# Patient Record
Sex: MALE | Race: WHITE | Employment: OTHER | URBAN - NONMETROPOLITAN AREA
[De-identification: names, ages, dates, MRNs, and addresses within clinical notes are randomized per-mention and may not be internally consistent; named-entity substitution may affect disease eponyms.]

---

## 2020-11-05 ENCOUNTER — TELEPHONE (OUTPATIENT)
Dept: FAMILY MEDICINE | Facility: OTHER | Age: 65
End: 2020-11-05

## 2020-11-05 DIAGNOSIS — E87.6 HYPOKALEMIA: ICD-10-CM

## 2020-11-05 DIAGNOSIS — F31.9 BIPOLAR AFFECTIVE DISORDER, REMISSION STATUS UNSPECIFIED (H): ICD-10-CM

## 2020-11-05 DIAGNOSIS — E87.1 HYPO-OSMOLALITY AND HYPONATREMIA: ICD-10-CM

## 2020-11-05 DIAGNOSIS — I10 ESSENTIAL HYPERTENSION: Primary | ICD-10-CM

## 2020-11-05 DIAGNOSIS — E66.9 OBESITY: ICD-10-CM

## 2020-11-05 DIAGNOSIS — L03.116 CELLULITIS OF LEFT LOWER LIMB: ICD-10-CM

## 2020-11-05 DIAGNOSIS — K21.9 GASTROESOPHAGEAL REFLUX DISEASE WITHOUT ESOPHAGITIS: ICD-10-CM

## 2020-11-05 DIAGNOSIS — M62.262 NONTRAUMATIC ISCHEMIC INFARCTION OF MUSCLE OF LEFT LOWER LEG: ICD-10-CM

## 2020-11-05 DIAGNOSIS — I48.0 PAROXYSMAL ATRIAL FIBRILLATION (H): ICD-10-CM

## 2020-11-05 DIAGNOSIS — F20.9 SCHIZOPHRENIA, UNSPECIFIED TYPE (H): ICD-10-CM

## 2020-11-05 RX ORDER — MULTIVITAMIN
1 TABLET ORAL DAILY
COMMUNITY
Start: 2020-11-05

## 2020-11-05 RX ORDER — CETIRIZINE HYDROCHLORIDE 10 MG/1
10 TABLET ORAL DAILY
COMMUNITY
Start: 2020-11-05

## 2020-11-05 RX ORDER — METOPROLOL SUCCINATE 50 MG/1
50 TABLET, EXTENDED RELEASE ORAL 2 TIMES DAILY
COMMUNITY
Start: 2020-11-05

## 2020-11-05 RX ORDER — ATORVASTATIN CALCIUM 80 MG/1
80 TABLET, FILM COATED ORAL
COMMUNITY
Start: 2020-11-05

## 2020-11-05 RX ORDER — AMITRIPTYLINE HYDROCHLORIDE 75 MG/1
75 TABLET ORAL AT BEDTIME
COMMUNITY
Start: 2020-11-05

## 2020-11-05 RX ORDER — ACETAMINOPHEN 325 MG/1
650 TABLET ORAL EVERY 6 HOURS PRN
COMMUNITY
Start: 2020-11-05

## 2020-11-05 RX ORDER — CLOPIDOGREL BISULFATE 75 MG/1
75 TABLET ORAL DAILY
COMMUNITY
Start: 2020-11-05

## 2020-11-05 RX ORDER — GABAPENTIN 100 MG/1
200 CAPSULE ORAL 2 TIMES DAILY
COMMUNITY
Start: 2020-11-05

## 2020-11-05 RX ORDER — OMEPRAZOLE 20 MG/1
20 TABLET, DELAYED RELEASE ORAL DAILY
COMMUNITY
Start: 2020-11-05

## 2020-11-05 RX ORDER — HALOPERIDOL 10 MG/1
10 TABLET ORAL 2 TIMES DAILY
COMMUNITY
Start: 2020-11-05

## 2020-11-05 RX ORDER — FERROUS GLUCONATE 324(38)MG
324 TABLET ORAL
COMMUNITY
Start: 2020-11-05

## 2020-11-05 RX ORDER — CHOLECALCIFEROL (VITAMIN D3) 25 MCG
1 CAPSULE ORAL DAILY
COMMUNITY
Start: 2020-11-05

## 2020-11-05 RX ORDER — DIGOXIN 125 MCG
125 TABLET ORAL DAILY
COMMUNITY
Start: 2020-11-05

## 2020-11-05 NOTE — TELEPHONE ENCOUNTER
----- Message from Bruna Timmons MD sent at 11/3/2020  1:35 PM CST -----  Regarding: FW: New [facility] patient  Please call for medication list and problem list.  Thanks   ----- Message -----  From: Maite Hernandez  Sent: 11/3/2020   1:20 PM CST  To: Bruna Timmons MD  Subject: New [facility] patient                             New patient assigned to you.  Staying at the [facility] 53 MRN [id number]

## 2020-11-05 NOTE — TELEPHONE ENCOUNTER
Medication and problem list updated. Fax from [facility] in MD's in-basket.     Fabiola Crow LPN.................. 11/5/2020 9:57 AM

## 2020-11-11 ENCOUNTER — NURSING HOME VISIT (OUTPATIENT)
Dept: GERIATRICS | Facility: OTHER | Age: 65
End: 2020-11-11
Attending: NURSE PRACTITIONER
Payer: MEDICARE

## 2020-11-11 DIAGNOSIS — L03.116 CELLULITIS OF LEFT LOWER LIMB: Primary | ICD-10-CM

## 2020-11-11 PROCEDURE — 99315 NF DSCHRG MGMT 30 MIN/LESS: CPT | Performed by: NURSE PRACTITIONER

## 2020-11-13 NOTE — PROGRESS NOTES
Visit Date:   11/11/2020      SKILLED NURSING FACILITY DISCHARGE SUMMARY      SKILLED NURSING FACILITY DATE OF ADMISSION:  11/03/2020.  Admitted from [facility].      MEDICAL HISTORY:  Cellulitis of left lower extremity, bipolar 1, atrial flutter with RVR, PAD, hyponatremia and hypokalemia, which resolved during hospitalization, chronic tobacco use.      MEDICATIONS:   1.  Amitriptyline 75 mg daily.   2.  Lipitor 80 mg daily.   3.  Zyrtec 10 mg daily.   4.  Plavix 75 mg daily.   5.  Vitamin B12 at 1000 mcg daily.   6.  Digoxin 0.125 mg daily.   7.  Iron sulfate 324 mg 1 tablet daily.   8.  Multivitamin 1 tablet daily.   9.  Omeprazole 20 mg 1 tablet daily.   10.  Vitamin D3 at 25 mcg 1 tablet daily.   11.  Vitamin E 100 units 1 tablet daily.   12.  Eliquis 5 mg 1 tablet twice daily.   13.  Gabapentin 100 mg 2 tablets twice daily.   14.  Haldol 10 mg twice daily.   15.  Metoprolol tartrate 50 mg 1 tablet twice daily.   16.  Acetaminophen 325 mg 2 tablets every 6 hours as needed.      ADVANCE DIRECTIVE:  CPR.      SUMMARY OF SKILLED NURSING FACILITY CARE:  The patient had a hospital stay at [facility] for cellulitis of the left lower extremity, which occurred after a previous hospitalization for a thrombectomy.  He was treated with IV and oral antibiotics.  He also had atrial flutter with RVR.  He did not tolerate amiodarone or sotalol.  He was treated with digoxin and metoprolol.  He is planning to have followup with Cardiology and also with wound care out of the [facility].      The patient was fairly independent upon admission.  He needed some help with skilled nursing for the wound at the left lower leg.  He also received some PT and OT to help with some strengthening.  He otherwise is independent in all cares.  He has been pleased with his stay at skilled nursing facility and is planning to discharge home.      DIAGNOSTIC TESTS:  None available per hospital discharge summary.      REVIEW OF  SYSTEMS:  A 12-point complete review of systems discussed with nursing staff and resident, all negative with the exception of positive findings of a wound at the left lower leg that is healing by secondary intention after thrombectomy at a previous hospitalization.      VITAL SIGNS:  Blood pressure 144/86, pulse 57, respiratory rate 16, temperature 96, SpO2 98% on room air, weight 232 pounds.      PHYSICAL EXAMINATION:   GENERAL:  Pleasant gentleman lying in bed, no acute distress.  Affect normal.  Alert and pleasant.  Answers questions appropriately.   SKIN:  Color pink.   HEENT:  Sclerae nonicteric.  Mucous membranes moist.   NECK:  Supple without adenopathy.  No thyromegaly.   LUNGS:  Fields clear to auscultation throughout.   CARDIOVASCULAR:  Regular rate and rhythm with no S3 auscultated.   ABDOMEN:  Soft and without masses, tenderness and organomegaly.   EXTREMITIES:  Bilateral extremities are without edema.  He has a dressing on the wound over the left shin, but there is no surrounding erythema noted.  There has been no purulent drainage per nursing staff.  They report this is healing nicely.      PLANNED DATE OF DISCHARGE:  2020      DISCHARGE PLAN:  Discharged to home with mental health services, and also will [facility] for [facility].        FOLLOWUP APPOINTMENTS:  Patient should follow-[facility] with [facility] within [facility] week of [facility] nursing facility discharge.  He will discuss with primary physician to determine if Cardiology referral is needed.      TIME SPENT ON DISCHARGE:  28 minutes.         DEVIN MISHRA NP             D: 2020   T: 2020   MT: OLLIE      Name:     JORDAN RIVERA   MRN:      [id number]        Account:      [id number]   :      1955           Visit Date:   2020      Document: [id number]       cc: [facility]

## 2020-11-29 ENCOUNTER — HOSPITAL ENCOUNTER (EMERGENCY)
Facility: OTHER | Age: 65
Discharge: NURSING, REHAB OR LONG-TERM CARE | End: 2020-11-29
Attending: PHYSICIAN ASSISTANT | Admitting: PHYSICIAN ASSISTANT
Payer: MEDICARE

## 2020-11-29 ENCOUNTER — APPOINTMENT (OUTPATIENT)
Dept: GENERAL RADIOLOGY | Facility: OTHER | Age: 65
End: 2020-11-29
Attending: PHYSICIAN ASSISTANT
Payer: MEDICARE

## 2020-11-29 VITALS
BODY MASS INDEX: 31.01 KG/M2 | WEIGHT: 234 LBS | SYSTOLIC BLOOD PRESSURE: 164 MMHG | OXYGEN SATURATION: 96 % | DIASTOLIC BLOOD PRESSURE: 101 MMHG | HEIGHT: 73 IN | HEART RATE: 84 BPM | RESPIRATION RATE: 15 BRPM

## 2020-11-29 DIAGNOSIS — R00.0 TACHYCARDIA: ICD-10-CM

## 2020-11-29 LAB
ANION GAP SERPL CALCULATED.3IONS-SCNC: 9 MMOL/L (ref 3–14)
BASOPHILS # BLD AUTO: 0 10E9/L (ref 0–0.2)
BASOPHILS NFR BLD AUTO: 0.8 %
BUN SERPL-MCNC: 13 MG/DL (ref 7–25)
CALCIUM SERPL-MCNC: 9.5 MG/DL (ref 8.6–10.3)
CHLORIDE SERPL-SCNC: 104 MMOL/L (ref 98–107)
CO2 SERPL-SCNC: 24 MMOL/L (ref 21–31)
CREAT SERPL-MCNC: 1.05 MG/DL (ref 0.7–1.3)
DIFFERENTIAL METHOD BLD: NORMAL
EOSINOPHIL # BLD AUTO: 0.1 10E9/L (ref 0–0.7)
EOSINOPHIL NFR BLD AUTO: 1.8 %
ERYTHROCYTE [DISTWIDTH] IN BLOOD BY AUTOMATED COUNT: 13.9 % (ref 10–15)
GFR SERPL CREATININE-BSD FRML MDRD: 71 ML/MIN/{1.73_M2}
GLUCOSE SERPL-MCNC: 100 MG/DL (ref 70–105)
HCT VFR BLD AUTO: 42.9 % (ref 40–53)
HGB BLD-MCNC: 14.1 G/DL (ref 13.3–17.7)
IMM GRANULOCYTES # BLD: 0 10E9/L (ref 0–0.4)
IMM GRANULOCYTES NFR BLD: 0.4 %
LYMPHOCYTES # BLD AUTO: 1.5 10E9/L (ref 0.8–5.3)
LYMPHOCYTES NFR BLD AUTO: 29.5 %
MCH RBC QN AUTO: 29.9 PG (ref 26.5–33)
MCHC RBC AUTO-ENTMCNC: 32.9 G/DL (ref 31.5–36.5)
MCV RBC AUTO: 91 FL (ref 78–100)
MONOCYTES # BLD AUTO: 0.5 10E9/L (ref 0–1.3)
MONOCYTES NFR BLD AUTO: 10.8 %
NEUTROPHILS # BLD AUTO: 2.9 10E9/L (ref 1.6–8.3)
NEUTROPHILS NFR BLD AUTO: 56.7 %
PLATELET # BLD AUTO: 155 10E9/L (ref 150–450)
POTASSIUM SERPL-SCNC: 3.8 MMOL/L (ref 3.5–5.1)
RBC # BLD AUTO: 4.71 10E12/L (ref 4.4–5.9)
SODIUM SERPL-SCNC: 137 MMOL/L (ref 134–144)
WBC # BLD AUTO: 5 10E9/L (ref 4–11)

## 2020-11-29 PROCEDURE — 71045 X-RAY EXAM CHEST 1 VIEW: CPT | Mod: TC

## 2020-11-29 PROCEDURE — 80048 BASIC METABOLIC PNL TOTAL CA: CPT | Performed by: PHYSICIAN ASSISTANT

## 2020-11-29 PROCEDURE — 99283 EMERGENCY DEPT VISIT LOW MDM: CPT | Performed by: PHYSICIAN ASSISTANT

## 2020-11-29 PROCEDURE — 99285 EMERGENCY DEPT VISIT HI MDM: CPT | Mod: 25 | Performed by: PHYSICIAN ASSISTANT

## 2020-11-29 PROCEDURE — 36415 COLL VENOUS BLD VENIPUNCTURE: CPT | Performed by: PHYSICIAN ASSISTANT

## 2020-11-29 PROCEDURE — 85025 COMPLETE CBC W/AUTO DIFF WBC: CPT | Performed by: PHYSICIAN ASSISTANT

## 2020-11-29 PROCEDURE — 93010 ELECTROCARDIOGRAM REPORT: CPT | Performed by: INTERNAL MEDICINE

## 2020-11-29 PROCEDURE — 93005 ELECTROCARDIOGRAM TRACING: CPT | Performed by: PHYSICIAN ASSISTANT

## 2020-11-29 ASSESSMENT — ENCOUNTER SYMPTOMS
FEVER: 0
CHILLS: 0
ABDOMINAL PAIN: 0
BACK PAIN: 0
ADENOPATHY: 0
BRUISES/BLEEDS EASILY: 0
CONFUSION: 0
WOUND: 0
HEMATURIA: 0
CHEST TIGHTNESS: 0
SHORTNESS OF BREATH: 0

## 2020-11-29 ASSESSMENT — MIFFLIN-ST. JEOR: SCORE: 1900.3

## 2020-11-29 NOTE — ED AVS SNAPSHOT
[facility]  [address]  [place] [address]  Phone: [phone]  Fax: [phone]                                    Everton Koch   MRN: [id number]    Department: [facility]   Date of Visit: 11/29/2020           After Visit Summary Signature Page    I have received my discharge instructions, and my questions have been answered. I have discussed any challenges I see with this plan with the nurse or doctor.    ..........................................................................................................................................  Patient/Patient Representative Signature      ..........................................................................................................................................  Patient Representative Print Name and Relationship to Patient    ..................................................               ................................................  Date                                   Time    ..........................................................................................................................................  Reviewed by Signature/Title    ...................................................              ..............................................  Date                                               Time          22EPIC Rev 08/18

## 2020-11-30 LAB — INTERPRETATION ECG - MUSE: NORMAL

## 2020-11-30 NOTE — DISCHARGE INSTRUCTIONS
Plenty of fluids and rest.  As we discussed your heart rhythm and rate.  Very well at this time as does the rest your physical exam, vital signs and lab work.  Is unclear what occurred earlier today with your heart rate.  Please follow-up with your PCP as needed or return ED if there are worsening or concerning symptoms.

## 2020-11-30 NOTE — ED TRIAGE NOTES
Patient brought to ED by EMS from the [facility].  Per EMS staff at [facility] thought patient was in afib with RVR.   Patient has history.  Upon arrival EMS sates patient's HR was ;s sinus rhythm. Patient a/o x3. Has no complaints at this time.

## 2020-11-30 NOTE — ED PROVIDER NOTES
History     Chief Complaint   Patient presents with     Palpitations     HPI  Everton Koch is a 64 year old male who is brought to ED by EMS from the [facility].  Per EMS staff at halfway thought patient was in afib with RVR.   Patient has history.  Upon arrival EMS sates patient's HR was ;s sinus rhythm. Patient a/o x3. Has no complaints at this time.     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 11/05/2020     Priority: Medium     Cellulitis of left lower limb 11/05/2020     Priority: Medium     Paroxysmal atrial fibrillation (H) 11/05/2020     Priority: Medium     Gastroesophageal reflux disease without esophagitis 11/05/2020     Priority: Medium     Hypokalemia 11/05/2020     Priority: Medium     Essential hypertension 11/05/2020     Priority: Medium     Hypo-osmolality and hyponatremia 11/05/2020     Priority: Medium     Bipolar disorder (H) 11/05/2020     Priority: Medium     Obesity 11/05/2020     Priority: Medium     Schizophrenia (H) 11/05/2020     Priority: Medium        Past Medical History:    History reviewed. No pertinent past medical history.    Past Surgical History:    History reviewed. No pertinent surgical history.    Family History:    History reviewed. No pertinent family history.    Social History:  Marital Status:  Single [1]  Social History     Tobacco Use     Smoking status: Current Every Day Smoker     Smokeless tobacco: Former User   Substance Use Topics     Alcohol use: None     Drug use: None        Medications:         acetaminophen (TYLENOL) 325 MG tablet       amitriptyline (ELAVIL) 75 MG tablet       apixaban ANTICOAGULANT (ELIQUIS ANTICOAGULANT) 5 MG tablet       atorvastatin (LIPITOR) 80 MG tablet       cetirizine (ZYRTEC) 10 MG tablet       Cholecalciferol (VITAMIN D-3) 25 MCG (1000 UT) CAPS       clopidogrel (PLAVIX) 75 MG tablet       digoxin  "(LANOXIN) 125 MCG tablet       ferrous gluconate (FERGON) 324 (38 Fe) MG tablet       gabapentin (NEURONTIN) 100 MG capsule       haloperidol (HALDOL) 10 MG tablet       metoprolol succinate ER (TOPROL-XL) 50 MG 24 hr tablet       multivitamin (ONE-DAILY) tablet       omeprazole (PRILOSEC OTC) 20 MG EC tablet       vitamin B-12 (CYANOCOBALAMIN) 1000 MCG CR tablet       vitamin E (TOCOPHEROL) 100 units (45 mg) capsule          Review of Systems   Constitutional: Negative for chills and fever.   HENT: Negative for congestion.    Eyes: Negative for visual disturbance.   Respiratory: Negative for chest tightness and shortness of breath.    Cardiovascular: Negative for chest pain.   Gastrointestinal: Negative for abdominal pain.   Genitourinary: Negative for hematuria.   Musculoskeletal: Negative for back pain.   Skin: Negative for rash and wound.   Neurological: Negative for syncope.   Hematological: Negative for adenopathy. Does not bruise/bleed easily.   Psychiatric/Behavioral: Negative for confusion.       Physical Exam   BP: (!) 137/107  Pulse: 93  Resp: 20  Height: 185.4 cm (6' 1\")  Weight: 106.1 kg (234 lb)  SpO2: 94 %      Physical Exam  Constitutional:       General: He is not in acute distress.     Appearance: He is well-developed. He is not diaphoretic.   HENT:      Head: Normocephalic and atraumatic.   Eyes:      General: No scleral icterus.     Conjunctiva/sclera: Conjunctivae normal.   Neck:      Musculoskeletal: Neck supple.   Cardiovascular:      Rate and Rhythm: Normal rate and regular rhythm.   Pulmonary:      Effort: Pulmonary effort is normal.      Breath sounds: Normal breath sounds.   Abdominal:      Palpations: Abdomen is soft.      Tenderness: There is no abdominal tenderness.   Musculoskeletal:         General: No deformity.   Lymphadenopathy:      Cervical: No cervical adenopathy.   Skin:     General: Skin is warm and dry.      Findings: No rash.      Comments: Healing surgical wound to left " shin area, no obvious redness, swelling, purulent drainage or warmth around area.   Neurological:      Mental Status: He is alert and oriented to person, place, and time. Mental status is at baseline.   Psychiatric:         Mood and Affect: Mood normal.         Behavior: Behavior normal.         ED Course        Procedures          EKG read at 1831. HR 92, NSR, no ST changes.     Critical Care time:  none               Results for orders placed or performed during the hospital encounter of 11/29/20 (from the past 24 hour(s))   CBC with platelets differential   Result Value Ref Range    WBC 5.0 4.0 - 11.0 10e9/L    RBC Count 4.71 4.4 - 5.9 10e12/L    Hemoglobin 14.1 13.3 - 17.7 g/dL    Hematocrit 42.9 40.0 - 53.0 %    MCV 91 78 - 100 fl    MCH 29.9 26.5 - 33.0 pg    MCHC 32.9 31.5 - 36.5 g/dL    RDW 13.9 10.0 - 15.0 %    Platelet Count 155 150 - 450 10e9/L    Diff Method Automated Method     % Neutrophils 56.7 %    % Lymphocytes 29.5 %    % Monocytes 10.8 %    % Eosinophils 1.8 %    % Basophils 0.8 %    % Immature Granulocytes 0.4 %    Absolute Neutrophil 2.9 1.6 - 8.3 10e9/L    Absolute Lymphocytes 1.5 0.8 - 5.3 10e9/L    Absolute Monocytes 0.5 0.0 - 1.3 10e9/L    Absolute Eosinophils 0.1 0.0 - 0.7 10e9/L    Absolute Basophils 0.0 0.0 - 0.2 10e9/L    Abs Immature Granulocytes 0.0 0 - 0.4 10e9/L   Basic metabolic panel   Result Value Ref Range    Sodium 137 134 - 144 mmol/L    Potassium 3.8 3.5 - 5.1 mmol/L    Chloride 104 98 - 107 mmol/L    Carbon Dioxide 24 21 - 31 mmol/L    Anion Gap 9 3 - 14 mmol/L    Glucose 100 70 - 105 mg/dL    Urea Nitrogen 13 7 - 25 mg/dL    Creatinine 1.05 0.70 - 1.30 mg/dL    GFR Estimate 71 >60 mL/min/[1.73_m2]    GFR Estimate If Black 86 >60 mL/min/[1.73_m2]    Calcium 9.5 8.6 - 10.3 mg/dL   XR Chest Port 1 View    Narrative    PROCEDURE INFORMATION:   Exam: XR Chest, 1 View   Exam date and time: 11/29/2020 6:52 PM   Age: 64 years old   Clinical indication: Other: Tachycardia      TECHNIQUE:   Imaging protocol: XR of the chest   Views: 1 view.     COMPARISON:   No relevant prior studies available.     FINDINGS:   Lungs: Prominent interstitial markings likely chronic.. No consolidation.   Pleural space: No pleural effusion. No pneumothorax.   Heart/Mediastinum: Cardiac silhouette enlarged.. Tortuous aorta. Probable   hiatal hernia.   Diaphragm: Elevated left hemidiaphragm.   Bones/joints: Unremarkable.       Impression    IMPRESSION:   No acute lung pathology.     THIS DOCUMENT HAS BEEN ELECTRONICALLY SIGNED BY CARLOTTA MCMAHAN MD       Medications - No data to display    Assessments & Plan (with Medical Decision Making)   Pt nontoxic in NAD. Heart, lung, bowel sounds normal, abd soft, nontender to palpation, nondistended. VSS, afebrile.     He also has Healing surgical wound to left shin area, no obvious redness, swelling, purulent drainage or warmth around area, this does not currently seem infected.     Basic lab work appears very well with no leukocytosis, normal hemoglobin, BMP is unremarkable.  His chest x-ray shows no acute findings.  I did not obtain any troponin studies as he does not have any chest discomfort.  His EKG shows normal sinus rhythm.    Is difficult for me to say what may have occurred earlier whether he had a bout of A. fib or some other tachyarrhythmia but currently appears to be doing very well and there are no further concerns at this time.  I am currently finding no medical contraindication to keep him from discharging him.  He has a follow-up appointment with PCP this coming week where he can be reassessed as well.    Strict return precautions are given to the pt, they will return if symptoms are worsening or concerning. The pt understands and agrees with the plan and they are discharged.     Miguel Epperson PA-C      I have reviewed the nursing notes.    I have reviewed the findings, diagnosis, plan and need for follow up with the patient.       Discharge  Medication List as of 11/29/2020  8:06 PM          Final diagnoses:   Tachycardia       11/29/2020   [facility]     Miguel Epperson PA  11/29/20 [id number]

## 2020-12-01 ENCOUNTER — DOCUMENTATION ONLY (OUTPATIENT)
Dept: OTHER | Facility: CLINIC | Age: 65
End: 2020-12-01

## 2020-12-14 ENCOUNTER — NURSING HOME VISIT (OUTPATIENT)
Dept: GERIATRICS | Facility: OTHER | Age: 65
End: 2020-12-14
Attending: NURSE PRACTITIONER
Payer: MEDICARE

## 2020-12-14 DIAGNOSIS — I48.0 PAROXYSMAL ATRIAL FIBRILLATION (H): Primary | ICD-10-CM

## 2020-12-14 PROCEDURE — 99308 SBSQ NF CARE LOW MDM 20: CPT | Performed by: NURSE PRACTITIONER

## 2020-12-15 NOTE — PROGRESS NOTES
Visit Date:   12/14/2020      CHIEF COMPLAINT:  A 60-day recertification.      HISTORY OF PRESENT ILLNESS:  The patient was admitted to skilled nursing facility for wound care on 11/03.  He is followed by [facility].  He has an appointment with them later this week.  He is hoping to be discharged home soon.  He does have history of cellulitis that needed hospitalization.  This occurred after he had a thrombectomy.  He has not had any further cellulitis.  On 11/11, he was given discharge orders to return to his home, as he was going to have outpatient wound management; however, that fell through, so he needed to stay at skilled nursing facility.        By reviewing his Epic records, he was seen in the Emergency Department on 11/29 at [facility] for a heart rate between 90 to 100.  Those notes showed that nursing home staff were concerned that he was having atrial fibrillation with RVR.  He had an Emergency Department evaluation which showed an EKG in normal sinus rhythm, rate in the 90s, and labs and chest x-ray that were completed were unremarkable.  He was not having any other cardiac findings such as angina; therefore, troponins were not obtained.  The patient returned back to skilled nursing facilities without any concerns or recurrent tachycardia.  He does have a history of bipolar disorder and schizophrenia.  He meets with the ACT group weekly and has psychiatric visits through them.  That is usually through the telephone.  The patient tells me he is doing well and hoping to discharge home soon.      PAST MEDICAL HISTORY, PAST SURGICAL HISTORY, ALLERGIES, MEDICATIONS, RISK FACTORS, SOCIAL HISTORY:  All reviewed.      ADVANCE DIRECTIVE:  FULL CODE.      REVIEW OF SYSTEMS:  A 12-point complete review of systems discussed with nursing staff and resident.  See HPI for positive findings, otherwise unremarkable.      PHYSICAL EXAMINATION:   GENERAL:  Pleasant gentleman lying in bed, no acute  distress.   SKIN:  Color pink.   HEENT:  Sclerae nonicteric.  Mucous membranes moist.   NECK:  Supple without adenopathy.   LUNGS:  Fields with faint rhonchi that did clear with coughing.  The patient is a smoker.   CARDIOVASCULAR:  Regular rate and rhythm, no S3 auscultated.   ABDOMEN:  Soft and without masses, tenderness or organomegaly.  No suprapubic tenderness.   EXTREMITIES:  Without edema.      ASSESSMENT:   1.  Left lower extremity postsurgical wounds.   2.  Hypertension.   3.  Paroxysmal atrial fibrillation.   4.  Chronic anticoagulation.   5.  Schizophrenia.   6.  Bipolar disorder.      PLAN:  Continue to follow through [facility].  Continue with ongoing psychiatric management and psychiatric program.  Otherwise, we will renew orders at this time.         DEVIN MISHRA NP             D: 2020   T: 2020   MT: OLLIE      Name:     JORDAN RIVERA   MRN:      [id number]        Account:      [id number]   :      1955           Visit Date:   2020      Document: [id number]       cc: Bruna Timmons MD       [facility]

## 2021-01-04 ENCOUNTER — NURSING HOME VISIT (OUTPATIENT)
Dept: GERIATRICS | Facility: OTHER | Age: 66
End: 2021-01-04
Attending: NURSE PRACTITIONER
Payer: MEDICARE

## 2021-01-04 DIAGNOSIS — I48.0 PAROXYSMAL ATRIAL FIBRILLATION (H): Primary | ICD-10-CM

## 2021-01-04 PROCEDURE — 99315 NF DSCHRG MGMT 30 MIN/LESS: CPT | Performed by: NURSE PRACTITIONER

## 2021-01-05 NOTE — PROGRESS NOTES
Visit Date:   01/04/2021      CHIEF COMPLAINT:  Discharge evaluation.        SKILLED NURSING FACILITY DATE OF ADMISSION:  11/03/2020.  Admitted from [facility].      PAST MEDICAL HISTORY:  Cellulitis of left lower leg, bipolar disorder, atrial flutter with RVR, PAD, hyponatremia, hypokalemia, both resolved while hospitalized.      CURRENT MEDICATIONS:   1.  Amitriptyline 75 mg daily.   2.  Atorvastatin 80 mg daily.   3.  Cetirizine 10 mg daily.   4.  Cyanocobalamin 1000 mcg daily.   5.  Ferrous gluconate 324 one tablet daily.   6.  Omeprazole 20 mg daily.   7.  Vitamin D3 one tablet daily.   8.  Vitamin E 100 units 1 tablet daily.   9.  Eliquis 5 mg twice daily.   10.  Gabapentin 100 mg, give 200 mg 2 times daily.   11.  Haldol 10 mg twice daily.   12.  Acetaminophen 325 mg, give 2 tablets every 6 hours as needed for pain.      ADVANCE DIRECTIVE:  CPR.      SUMMARY OF CARE:  The patient was admitted from [place] needing daily wound care on the left lower extremity until the wound was healed.  He was seen by his podiatrist recently, who felt the patient was stable to only have bacitracin and a dry gauze and once it was healed to discharge home.  He received PT and OT for strengthening.      PERTINENT LABS AND DIAGNOSTIC STUDIES:  On 11/29/2020 patient had a CBC and basic metabolic panel within normal limits.      REVIEW OF SYSTEMS:  A 12-point complete review of systems discussed with nursing staff and resident, with no new findings.  The patient does have chronic underlying mental health disorder with schizophrenia and bipolar disorder but currently not having any hallucinations, jean carlos, depression or any new behaviors.      PHYSICAL EXAMINATION:   VITAL SIGNS:  Blood pressure 122/79, pulse 96, respiratory rate 18, temperature 97.5, SpO2 of 97% on room air.   GENERAL:  Pleasant gentleman in no acute distress.  Alert and pleasant and answers questions fairly appropriately.   SKIN:  Color pink.   HEENT:  Mucous  membranes moist.   NECK:  Supple without adenopathy.   LUNGS:  Leon clear to auscultation throughout.   CARDIOVASCULAR:  Regular with no S3 auscultated.   ABDOMEN:  Soft and without masses, tenderness and organomegaly.   EXTREMITIES:  Left lower extremity has a healed ulceration along the medial calf.  He has atrophy from his previous surgery.  However, this is healed.  No erythema or warmth.      ASSESSMENT:   1.  Left lower extremity postsurgical wound.   2.  Hyperlipidemia.   3.  Paroxysmal atrial fibrillation.   4.  Chronic anticoagulation.   5.  Schizophrenia.   6.  Bipolar disorder.      PLANNED DATE OF DISCHARGE:  2021.      DISCHARGE PLAN:  The patient will discharge home with plans to continue with his ACT program for his mental health.  [facility]  His wound is all healed.      TIME SPENT ON DISCHARGE:  18 minutes.         DEVIN MISHRA NP             D: 2021   T: 2021   MT: OLLIE      Name:     JORDAN RIVERA   MRN:      [id number]        Account:      [id number]   :      1955           Visit Date:   2021      Document: [id number]       cc: Bruna Timmons MD       [facility]